# Patient Record
Sex: FEMALE | Race: WHITE
[De-identification: names, ages, dates, MRNs, and addresses within clinical notes are randomized per-mention and may not be internally consistent; named-entity substitution may affect disease eponyms.]

---

## 2017-03-02 ENCOUNTER — HOSPITAL ENCOUNTER (OUTPATIENT)
Dept: HOSPITAL 61 - KCIC US | Age: 32
Discharge: HOME | End: 2017-03-02
Attending: FAMILY MEDICINE
Payer: COMMERCIAL

## 2017-03-02 DIAGNOSIS — R10.9: Primary | ICD-10-CM

## 2017-03-02 PROCEDURE — 76700 US EXAM ABDOM COMPLETE: CPT

## 2017-03-02 NOTE — [facility]
PROCEDURE 

Abdomen ultrasound. 

 

HISTORY 

Abdominal pain, right upper quadrant pain 

 

COMPARISON 

None 

 

FINDINGS 

Multiple sonographic images of the abdomen are submitted. There is no 

abnormality of the visualized pancreas. Abdominal aortic caliber is within

normal limits up to 1.7 centimeters. There is segmental visualization of 

the inferior vena cava. No focal hepatic lesion is demonstrated. There is 

very mild coarsening of the echotexture of the liver. Right lobe liver 

measured 16 centimeters longitudinal. No free fluid is demonstrated. Right

kidney measured 10.7 x 3.6 x 4.9 centimeters, no hydronephrosis. Left 

kidney measured 10 by 5 x 4.3 centimeters, no hydronephrosis. Spleen 

measured 9.7 centimeters. Gallbladder is present without intraluminal 

abnormality, wall thickening, pericholecystic fluid. Common bile duct is 

within normal limits at 0.4 cm. 

 

IMPRESSION 

1. Other than very mild coarsening of the echotexture of liver which may 

be due to very mild steatosis, no significant abnormality is demonstrated.

 

 

 

Electronically signed by: Trev Lockett MD (Mar 02, 2017 09:15:48)

## 2017-04-13 ENCOUNTER — HOSPITAL ENCOUNTER (EMERGENCY)
Dept: HOSPITAL 61 - ER | Age: 32
Discharge: HOME | End: 2017-04-13
Payer: COMMERCIAL

## 2017-04-13 VITALS — SYSTOLIC BLOOD PRESSURE: 143 MMHG | DIASTOLIC BLOOD PRESSURE: 76 MMHG

## 2017-04-13 VITALS — BODY MASS INDEX: 23.92 KG/M2 | WEIGHT: 130 LBS | HEIGHT: 62 IN

## 2017-04-13 DIAGNOSIS — F17.200: ICD-10-CM

## 2017-04-13 DIAGNOSIS — F12.10: ICD-10-CM

## 2017-04-13 DIAGNOSIS — R11.2: ICD-10-CM

## 2017-04-13 DIAGNOSIS — D25.9: Primary | ICD-10-CM

## 2017-04-13 LAB
ALBUMIN SERPL-MCNC: 3.8 G/DL (ref 3.4–5)
ALBUMIN/GLOB SERPL: 1.3 {RATIO} (ref 1–1.7)
ALP SERPL-CCNC: 70 U/L (ref 46–116)
ALT SERPL-CCNC: 61 U/L (ref 14–59)
ANION GAP SERPL CALC-SCNC: 8 MMOL/L (ref 6–14)
AST SERPL-CCNC: 89 U/L (ref 15–37)
BACTERIA #/AREA URNS HPF: (no result) /HPF
BARBITURATES UR-MCNC: (no result) UG/ML
BASOPHILS # BLD AUTO: 0 X10^3/UL (ref 0–0.2)
BASOPHILS NFR BLD: 1 % (ref 0–3)
BENZODIAZ UR-MCNC: (no result) UG/L
BILIRUB SERPL-MCNC: 1 MG/DL (ref 0.2–1)
BILIRUB UR QL STRIP: NEGATIVE
BUN SERPL-MCNC: 11 MG/DL (ref 7–20)
BUN/CREAT SERPL: 12 (ref 6–20)
CALCIUM SERPL-MCNC: 8.9 MG/DL (ref 8.5–10.1)
CANNABINOIDS UR-MCNC: (no result) UG/L
CHLORIDE SERPL-SCNC: 107 MMOL/L (ref 98–107)
CO2 SERPL-SCNC: 28 MMOL/L (ref 21–32)
COCAINE UR-MCNC: (no result) NG/ML
CREAT SERPL-MCNC: 0.9 MG/DL (ref 0.6–1)
EOSINOPHIL NFR BLD: 9 % (ref 0–3)
ERYTHROCYTE [DISTWIDTH] IN BLOOD BY AUTOMATED COUNT: 13.3 % (ref 11.5–14.5)
ETHANOL, URINE: (no result)
GFR SERPLBLD BASED ON 1.73 SQ M-ARVRAT: 88.4 ML/MIN
GLOBULIN SER-MCNC: 3 G/DL (ref 2.2–3.8)
GLUCOSE SERPL-MCNC: 87 MG/DL (ref 70–99)
GLUCOSE UR STRIP-MCNC: NEGATIVE MG/DL
HCT VFR BLD CALC: 37.3 % (ref 36–47)
HGB BLD-MCNC: 12.6 G/DL (ref 12–15.5)
LYMPHOCYTES # BLD: 2 X10^3/UL (ref 1–4.8)
LYMPHOCYTES NFR BLD AUTO: 42 % (ref 24–48)
MCH RBC QN AUTO: 32 PG (ref 25–35)
MCHC RBC AUTO-ENTMCNC: 34 G/DL (ref 31–37)
MCV RBC AUTO: 95 FL (ref 79–100)
METHADONE SERPL-MCNC: (no result) NG/ML
MONOCYTES NFR BLD: 11 % (ref 0–9)
NEUTROPHILS NFR BLD AUTO: 37 % (ref 31–73)
NITRITE UR QL STRIP: NEGATIVE
OPIATES UR-MCNC: (no result) NG/ML
PCP SERPL-MCNC: (no result) MG/DL
PH UR STRIP: 6 [PH]
PLATELET # BLD AUTO: 236 X10^3/UL (ref 140–400)
POTASSIUM SERPL-SCNC: 3.7 MMOL/L (ref 3.5–5.1)
PROT SERPL-MCNC: 6.8 G/DL (ref 6.4–8.2)
PROT UR STRIP-MCNC: NEGATIVE MG/DL
RBC # BLD AUTO: 3.92 X10^6/UL (ref 3.5–5.4)
RBC #/AREA URNS HPF: 0 /HPF (ref 0–2)
SODIUM SERPL-SCNC: 143 MMOL/L (ref 136–145)
SP GR UR STRIP: >=1.03
SQUAMOUS #/AREA URNS LPF: (no result) /LPF
UROBILINOGEN UR-MCNC: 0.2 MG/DL
WBC # BLD AUTO: 4.8 X10^3/UL (ref 4–11)
WBC #/AREA URNS HPF: (no result) /HPF (ref 0–4)
YEAST #/AREA URNS HPF: PRESENT /HPF

## 2017-04-13 PROCEDURE — 83690 ASSAY OF LIPASE: CPT

## 2017-04-13 PROCEDURE — 87591 N.GONORRHOEAE DNA AMP PROB: CPT

## 2017-04-13 PROCEDURE — 85027 COMPLETE CBC AUTOMATED: CPT

## 2017-04-13 PROCEDURE — 99285 EMERGENCY DEPT VISIT HI MDM: CPT

## 2017-04-13 PROCEDURE — 74022 RADEX COMPL AQT ABD SERIES: CPT

## 2017-04-13 PROCEDURE — 76856 US EXAM PELVIC COMPLETE: CPT

## 2017-04-13 PROCEDURE — 36415 COLL VENOUS BLD VENIPUNCTURE: CPT

## 2017-04-13 PROCEDURE — 96374 THER/PROPH/DIAG INJ IV PUSH: CPT

## 2017-04-13 PROCEDURE — 81025 URINE PREGNANCY TEST: CPT

## 2017-04-13 PROCEDURE — 87491 CHLMYD TRACH DNA AMP PROBE: CPT

## 2017-04-13 PROCEDURE — 81001 URINALYSIS AUTO W/SCOPE: CPT

## 2017-04-13 PROCEDURE — 96375 TX/PRO/DX INJ NEW DRUG ADDON: CPT

## 2017-04-13 PROCEDURE — 80053 COMPREHEN METABOLIC PANEL: CPT

## 2017-04-13 PROCEDURE — 80305 DRUG TEST PRSMV DIR OPT OBS: CPT

## 2017-04-13 PROCEDURE — 80320 DRUG SCREEN QUANTALCOHOLS: CPT

## 2017-04-13 NOTE — ED.ADGEN
Past Medical History


Past Medical History:  Other


Additional Past Medical Histor:  UTERINE FIBROIDS,OVARIAN CYST,CHOLESTASIS OF 

LIVER,HERNIA


Past Surgical History:  , Other


Additional Past Surgical Histo:  D&C


Additional Information:  


0.5 PPD


Alcohol Use:  None


Drug Use:  Marijuana





Adult General


Chief Complaint


Chief Complaint:  ABDOMINAL PAIN





HPI


HPI


Patient is a 32  year old woman, history of ovarian cysts, fibroids, who 

presents to the emergency department with a complaint of lower abdominal 

cramping, nausea, vomiting and loose stool the past 3 days. Patient states she 

also developed discharge from the vagina today. She denies any possibility of 

STI exposures, she does have an IUD in place. Denies any injuries, denies any 

fevers or chills, denies any weakness numbness or tingling, any sick contacts 

or exposures, no recent travel or surgery. She has not taken any medications 

prior to going to the ED. No blood in her stool or emesis, states it is food 

and fluid. Patient states she was evaluated several weeks ago for her 

"gallbladder", and was diagnosed with cholestasis of the liver, she denies any 

upper abdominal symptoms at this time.





Review of Systems


Review of Systems


Constitutional:  Denies fever or chills. []


Eyes:  Denies change in visual acuity. []


HENT:  Denies nasal congestion or sore throat. [] 


Respiratory:  Denies cough or shortness of breath. [] 


Cardiovascular:  Denies chest pain or edema. [] 


GI: Crampy lower quadrant abdominal pain, nausea, vomiting, diarrhea, no bloody 

stools or bloody emesis.


:  Denies dysuria. [] 


Musculoskeletal:  Denies back pain or joint pain. [] 


Integument:  Denies rash. [] 


Neurologic:  Denies headache, focal weakness or sensory changes. [] 


Endocrine:  Denies polyuria or polydipsia. [] 


Lymphatic:  Denies swollen glands. [] 


Psychiatric:  Denies depression or anxiety. []





Current Medications


Current Medications





 Current Medications








 Medications


  (Trade)  Dose


 Ordered  Sig/Suzanne  Start Time


 Stop Time Status Last Admin


Dose Admin


 


 Ketorolac


 Tromethamine


  (Toradol)  10 mg  1X  ONCE  17 14:45


 17 14:46 DC 17 15:20


10 MG


 


 Ondansetron HCl


  (Zofran)  4 mg  1X  ONCE  17 14:45


 17 14:46 DC 17 15:23


4 MG











Allergies


Allergies





 Allergies








Coded Allergies Type Severity Reaction Last Updated Verified


 


  No Known Drug Allergies    17 No











Physical Exam


Physical Exam





Constitutional: Well developed, well nourished, no acute distress, non-toxic 

appearance. []


HENT: Normocephalic, atraumatic, bilateral external ears normal, oropharynx 

moist, no oral exudates, nose normal. []


Eyes: PERRLA, EOMI, conjunctiva normal, no discharge. [] 


Neck: Normal range of motion, no tenderness, supple, no stridor. [] 


Cardiovascular:Heart rate regular rhythm, no murmur, S1, S2, rubs or gallops.]


Lungs & Thorax:  Bilateral breath sounds clear to auscultation, no wheezing, 

rhonchi, rales. No chest tenderness or crepitus. []


Abdomen: Bowel sounds normal, soft, tenderness to palpation in the suprapubic 

pubic region, and the lower abdomen, no rebound, rigidity, no guarding, no 

masses, no pulsatile masses. [] 


Skin: Warm, dry, no erythema, no rash. [] 


Back: No tenderness, no CVA tenderness. [] 


Extremities: No tenderness, no cyanosis, no clubbing, ROM intact, no edema. [] 


Neurologic: Alert and oriented X 3, normal motor function, normal sensory 

function, no focal deficits noted. []


Psychologic: Affect normal, judgement normal, mood normal. []





Healthy examination: Patient with a normal-appearing external examination, no 

injuries or lesions identified. Bimanual examination reveals a closed office, 

with palpable strings from IUD, patient with tenderness to palpation on the 

right lateral cervical region, no adnexal masses or tenderness identified, no 

left-sided adnexal masses or tenderness. No CMT. Patient noted to have a 

moderate amount of white discharge on glove, specimens taken without issue, 

cervix is normal in appearance.





Current Patient Data


Vital Signs





 Vital Signs








  Date Time  Temp Pulse Resp B/P Pulse Ox O2 Delivery O2 Flow Rate FiO2


 


17 15:52  76 25 132/62 100 Room Air  


 


17 14:14 98.6       





 98.6       








Lab Values





 Laboratory Tests








Test


  17


13:29 17


14:05 17


14:48


 


POC Urine HCG, Qualitative


  Hcg negative


(Negative) 


  


 


 


Urine Collection Type  Unknown   


 


Urine Color  Yellow   


 


Urine Clarity  Cloudy   


 


Urine pH  6.0   


 


Urine Specific Gravity  >=1.030   


 


Urine Protein


  


  Negativemg/dL


(NEG-TRACE) 


 


 


Urine Glucose (UA)


  


  Negativemg/dL


(NEG) 


 


 


Urine Ketones (Stick)


  


  Negativemg/dL


(NEG) 


 


 


Urine Blood  Small (NEG)   


 


Urine Nitrite


  


  Negative (NEG)


  


 


 


Urine Bilirubin


  


  Negative (NEG)


  


 


 


Urine Urobilinogen Dipstick


  


  0.2mg/dL (0.2


mg/dL) 


 


 


Urine Leukocyte Esterase  Trace (NEG)   


 


Urine RBC  0/HPF (0-2)   


 


Urine WBC  1-4/HPF (0-4)   


 


Urine Squamous Epithelial


Cells 


  Many/LPF  


  


 


 


Urine Bacteria


  


  Mod/HPF


(0-FEW) 


 


 


Urine Mucus  Marked/LPF   


 


Urine Yeast  Present/HPF   


 


Urine Opiates Screen  Neg (NEG)   


 


Urine Methadone Screen  Neg (NEG)   


 


Urine Barbiturates  Neg (NEG)   


 


Urine Phencyclidine Screen  Neg (NEG)   


 


Urine


Amphetamine/Methamphetamine 


  Neg (NEG)  


  


 


 


Urine Benzodiazepines Screen  Neg (NEG)   


 


Urine Cocaine Screen  Neg (NEG)   


 


Urine Cannabinoids Screen  Pos (NEG)   


 


Urine Ethyl Alcohol  Neg (NEG)   


 


White Blood Count


  


  


  4.8x10^3/uL


(4.0-11.0)


 


Red Blood Count


  


  


  3.92x10^6/uL


(3.50-5.40)


 


Hemoglobin


  


  


  12.6g/dL


(12.0-15.5)


 


Hematocrit


  


  


  37.3%


(36.0-47.0)


 


Mean Corpuscular Volume   95fL ()  


 


Mean Corpuscular Hemoglobin   32pg (25-35)  


 


Mean Corpuscular Hemoglobin


Concent 


  


  34g/dL (31-37)


 


 


Red Cell Distribution Width


  


  


  13.3%


(11.5-14.5)


 


Platelet Count


  


  


  236x10^3/uL


(140-400)


 


Neutrophils (%) (Auto)   37% (31-73)  


 


Lymphocytes (%) (Auto)   42% (24-48)  


 


Monocytes (%) (Auto)   11% (0-9)  H


 


Eosinophils (%) (Auto)   9% (0-3)  H


 


Basophils (%) (Auto)   1% (0-3)  


 


Neutrophils # (Auto)


  


  


  1.8x10^3uL


(1.8-7.7)


 


Lymphocytes # (Auto)


  


  


  2.0x10^3/uL


(1.0-4.8)


 


Monocytes # (Auto)


  


  


  0.5x10^3/uL


(0.0-1.1)


 


Eosinophils # (Auto)


  


  


  0.4x10^3/uL


(0.0-0.7)


 


Basophils # (Auto)


  


  


  0.0x10^3/uL


(0.0-0.2)


 


Sodium Level


  


  


  143mmol/L


(136-145)


 


Potassium Level


  


  


  3.7mmol/L


(3.5-5.1)


 


Chloride Level


  


  


  107mmol/L


()


 


Carbon Dioxide Level


  


  


  28mmol/L


(21-32)


 


Anion Gap   8 (6-14)  


 


Blood Urea Nitrogen


  


  


  11mg/dL (7-20)


 


 


Creatinine


  


  


  0.9mg/dL


(0.6-1.0)


 


Estimated GFR


(Cockcroft-Gault) 


  


  88.4  


 


 


BUN/Creatinine Ratio   12 (6-20)  


 


Glucose Level


  


  


  87mg/dL


(70-99)


 


Calcium Level


  


  


  8.9mg/dL


(8.5-10.1)


 


Total Bilirubin


  


  


  1.0mg/dL


(0.2-1.0)


 


Aspartate Amino Transferase


(AST) 


  


  89U/L (15-37)


H


 


Alanine Aminotransferase (ALT)


  


  


  61U/L (14-59)


H


 


Alkaline Phosphatase


  


  


  70U/L ()


 


 


Total Protein


  


  


  6.8g/dL


(6.4-8.2)


 


Albumin


  


  


  3.8g/dL


(3.4-5.0)


 


Albumin/Globulin Ratio   1.3 (1.0-1.7)  


 


Lipase


  


  


  83U/L ()


 





 Laboratory Tests


17 14:48








 Laboratory Tests


17 14:48











Microbiology


17 Wet Prep - Final, Complete


          








EKG


EKG


Not indicated. []





Radiology/Procedures


Radiology/Procedures


[]


 [facility]


 [address]  [place], [place] [id number] [phone]


 


 IMAGING REPORT





 Signed





PATIENT: BENJAMIN EDMOND ACCOUNT: [id number] MRN#: [id number]


: 1985 LOCATION: ER AGE: 32


SEX: F EXAM DT: 17 ACCESSION#: [id number]


STATUS: REG ER ORD. PHYSICIAN: BERNICE RODRIGUEZ DO 


REASON: RL pelvic pain


PROCEDURE: PELVIS COMPLETE











PROCEDURE 


Pelvic ultrasound. 


 


HISTORY 


Right pelvic pain. 


 


TECHNIQUE 


Real-time ultrasound imaging of the pelvis using transabdominal and 


transvaginal window is performed. 


 


COMPARISON 


None. 


 


FINDINGS 


Right ovary measures 3.5 x 2.6 x 1.7 cm. Left ovary measures 3 x 3.1 x 2.2


cm. Normal blood flow in the ovaries. Small follicles are seen 


bilaterally. 


No evidence of adnexal mass. No cul-de-sac free fluid. 


The endometrial stripe is normal measuring 2 millimeters. IUD is seen in 


appropriate position. 


Uterus measures 6.9 x 5.3 x 3.8 cm. Anteverted uterus. There is a right 


hypoechoic fibroid near the fundus measuring 1.3 x 1.5 x 1.7 cm. 


 


IMPRESSION 


 


 


 


1. Small right fundal fibroid.


2. IUD in appropriate position.


3. Normal blood flow in the ovaries.


 


 


 


 


Electronically signed by: Jose Antonio Ahumada MD (2017 18:14:06)














DICTATED and SIGNED BY:     JOSE ANTONIO AHUMADA MD


DATE:     17 1813





CC: BERNICE RODRIGUEZ DO; NO PCP ~


Impressions:





 [facility]


 [address] [facility] [place] [address]


 [phone]


 


 IMAGING REPORT





 Signed





PATIENT: BENJAMIN EDMOND ACCOUNT: [id number] MRN#: [id number]


: 1985 LOCATION: ER AGE: 32


SEX: F EXAM DT: 17 ACCESSION#: [id number]


STATUS: REG ER ORD. PHYSICIAN: BERNICE RODRIGUEZ DO 


REASON: abd pain


PROCEDURE: ACUTE ABDOMEN SERIES











Acute abdomen series with chest, 3 views, 2017:





History: Lower abdominal pain





An IUD is projected over the mid pelvis. A linear radiopacity projected over


the midabdomen is probably an umbilical ornament. Clinical correlation is


suggested.





There is only small amount of gas in the GI tract in a nonspecific pattern. No


free air seen in the abdomen. There is no evidence of organomegaly. A lower


pelvic calcification on the right is probably a phlebolith.





The heart size is normal. The lungs are clear. There is no evidence of pleural


fluid.








IMPRESSION:


1. An IUD is in place.


2. No acute abdominal abnormality is detected.














DICTATED and SIGNED BY:     MORITZ,RICK S MD


DATE:     17 1535





CC: BERNICE RODRIGUEZ DO; NO PCP ~





Course & Med Decision Making


Course & Med Decision Making


Pertinent Labs and Imaging studies reviewed. (See chart for details)





Due to patient's complaints, laboratory studies, acute abdominal x-ray and 

ultrasound of the pelvis were obtained after discussion at bedside. Patient 

also received pain medication and antiemetics. Laboratory studies reveal no 

acutely concerning findings, ultrasound of the pelvis reveals a right fibroid, 

consistent with location of the patient's discomfort. On reevaluation patient 

is feeling much better, cramping has resolved, and she's had no further 

episodes of vomiting or diarrhea in the ED. Patient states that he does have 

follow-up with a primary care provider, [facility], 

but was receptive to additional resources, she was given a list of providers 

and contact information for Dr. Servin of OB/GYN. Instructed the patient to 

stay well-hydrated, get plenty of rest, follow dietary recommendations, she may 

be experiencing a viral illness causing her other symptoms. Concerning symptoms 

to prompt return to the ED for additional evaluation were discussed. Patient 

received perfusion for Bentyl and Zofran in the ED, voiced understanding and 

agreement with plan and precautions as stated, and was discharged home in 

stable condition with plan as above.





Dragon Disclaimer


Dragon Disclaimer


This electronic medical record was generated, in whole or in part, using a 

voice recognition dictation system.





Departure


Impression:  


 Primary Impression:  


 Fibroid


 Additional Impressions:  


 Nausea and vomiting


 Abdominal pain


Disposition:  01 HOME, SELF-CARE


Condition:  IMPROVED


Scripts


Dicyclomine Hcl (Bentyl)10 Mg Cljiwts00 Mg PO QID PRN PAIN #12 TAB


   Prov:BERNICE RODRIGUEZ DO         17


Ondansetron Hcl (Zofran)4 Mg Tablet4 Mg PO BID PRN NAUSEA/VOMITING #12 TAB


   Prov:BERNICE RODRIGUEZ DO         17





Problem Qualifiers








BERNICE RODRIGUEZ DO 2017 18:36

## 2017-04-13 NOTE — RAD
PROCEDURE 

Pelvic ultrasound. 

 

HISTORY 

Right pelvic pain. 

 

TECHNIQUE 

Real-time ultrasound imaging of the pelvis using transabdominal and 

transvaginal window is performed. 

 

COMPARISON 

None. 

 

FINDINGS 

Right ovary measures 3.5 x 2.6 x 1.7 cm. Left ovary measures 3 x 3.1 x 2.2

cm. Normal blood flow in the ovaries. Small follicles are seen 

bilaterally. 

No evidence of adnexal mass. No cul-de-sac free fluid. 

The endometrial stripe is normal measuring 2 millimeters. IUD is seen in 

appropriate position. 

Uterus measures 6.9 x 5.3 x 3.8 cm. Anteverted uterus. There is a right 

hypoechoic fibroid near the fundus measuring 1.3 x 1.5 x 1.7 cm. 

 

IMPRESSION 

 

 

 

1. Small right fundal fibroid.

2. IUD in appropriate position.

3. Normal blood flow in the ovaries.

 

 

 

 

Electronically signed by: Jose Antonio Ahumada MD (Apr 13, 2017 18:14:06)

## 2017-04-13 NOTE — RAD
Acute abdomen series with chest, 3 views, 4/13/2017:



History: Lower abdominal pain



An IUD is projected over the mid pelvis. A linear radiopacity projected over

the midabdomen is probably an umbilical ornament. Clinical correlation is

suggested.



There is only small amount of gas in the GI tract in a nonspecific pattern. No

free air seen in the abdomen. There is no evidence of organomegaly. A lower

pelvic calcification on the right is probably a phlebolith.



The heart size is normal. The lungs are clear. There is no evidence of pleural

fluid.





IMPRESSION:

1. An IUD is in place.

2. No acute abdominal abnormality is detected.

## 2019-12-17 ENCOUNTER — HOSPITAL ENCOUNTER (EMERGENCY)
Dept: HOSPITAL 61 - ER | Age: 34
Discharge: HOME | End: 2019-12-17
Payer: SELF-PAY

## 2019-12-17 VITALS — SYSTOLIC BLOOD PRESSURE: 134 MMHG | DIASTOLIC BLOOD PRESSURE: 69 MMHG

## 2019-12-17 VITALS — WEIGHT: 132 LBS | HEIGHT: 62 IN | BODY MASS INDEX: 24.29 KG/M2

## 2019-12-17 DIAGNOSIS — R10.13: Primary | ICD-10-CM

## 2019-12-17 NOTE — PHYS DOC
Past Medical History


Past Medical History:  Other


Additional Past Medical Histor:  UTERINE FIBROIDS,OVARIAN CYST,CHOLESTASIS OF 

LIVER,HERNIA, heart murmur


Past Surgical History:  , Other


Additional Past Surgical Histo:  D&C


Alcohol Use:  Rarely


Drug Use:  None





Adult General


Chief Complaint


Chief Complaint:  FLU SYMPTOM





HPI


HPI





Patient is a 34  year old AA female who presents to the emergency department 

with complaints of epigastric pain and flu symptoms. Patient states that 

symptoms began 3 days ago. She has noticed an increase in her epigastric pain 

after taking multiple OTC medications for her flu like sx including dayquil, 

nyquil,  benadryl, sudafed, tylenol, and ibuprofen. She states the epigastric 

pain makes her feel nauseated, but denies any vomiting or diarrhea. Pt states 

she last had a fever 2 days ago. She has had a dry cough for the last 3 days. 

Currently, she rates the pain an 8/10 on the pain scale she denies any 

alleviating factors. Pt states she has been told her abdominal pain was due to 

GERD in the past. All other ROS is neg unless otherwise noted in HPI.





Review of Systems


Review of Systems


See Above





Allergies


Allergies





Allergies








Coded Allergies Type Severity Reaction Last Updated Verified


 


  No Known Drug Allergies    17 No











Physical Exam


Physical Exam


See Above


Constitutional: Well developed, well nourished, no acute distress, non-toxic 

appearance. []


HENT: Normocephalic, atraumatic, bilateral external ears normal, nose normal. []


Eyes: PERRLA, EOMI, conjunctiva normal, no discharge. [] 


Neck: Normal range of motion, no stridor. [] 


Cardiovascular:Heart rate regular rhythm, no murmur []


Lungs & Thorax:  Bilateral breath sounds clear to auscultation, Respirations 

even and unlabored, no retractions, no respiratory distress []


Abdomen: Bowel sounds normal, soft, epigastric TTP, no rebound tenderness, no 

guarding, no masses, no pulsatile masses. [] 


Skin: Warm, dry, no erythema, no rash. [] 


Extremities: No cyanosis, ROM intact


Neurologic: Alert and oriented X 3,  no focal deficits noted. []


Psychologic: Affect normal, judgement normal, mood normal. []





Current Patient Data


Vital Signs





                                   Vital Signs








  Date Time  Temp Pulse Resp B/P (MAP) Pulse Ox O2 Delivery O2 Flow Rate FiO2


 


19 09:34 98.3 70 16 134/69 (90) 98 Room Air  





 98.3       











EKG


EKG


[]





Radiology/Procedures


Radiology/Procedures


[]





Course & Med Decision Making


Course & Med Decision Making


Pertinent Labs and Imaging studies reviewed. (See chart for details)


dx: medical screening exam 


A medical screening exam was performed, patient was found to have no emergent 

medical condition. The plan of care would've included a GI cocktail, diet 

instructions, and GERD instructions . However, the patient eloped after talking 

with registration.


[]


[]





Dragon Disclaimer


Dragon Disclaimer


This electronic medical record was generated, in whole or in part, using a voice

 recognition dictation system.





Departure


Departure


Impression:  


   Primary Impression:  


   Encounter for medical screening examination


Disposition:  01 HOME, SELF-CARE (pt eloped after speaking with registration)


Condition:  STABLE


Referrals:  


NO PCP (PCP)











FAITH THOMAS       Dec 17, 2019 10:03

## 2020-05-30 ENCOUNTER — HOSPITAL ENCOUNTER (EMERGENCY)
Dept: HOSPITAL 61 - ER | Age: 35
Discharge: HOME | End: 2020-05-30
Payer: COMMERCIAL

## 2020-05-30 VITALS — WEIGHT: 137.13 LBS | BODY MASS INDEX: 25.23 KG/M2 | HEIGHT: 62 IN

## 2020-05-30 VITALS
DIASTOLIC BLOOD PRESSURE: 70 MMHG | SYSTOLIC BLOOD PRESSURE: 142 MMHG | SYSTOLIC BLOOD PRESSURE: 142 MMHG | DIASTOLIC BLOOD PRESSURE: 70 MMHG | SYSTOLIC BLOOD PRESSURE: 142 MMHG | DIASTOLIC BLOOD PRESSURE: 70 MMHG

## 2020-05-30 DIAGNOSIS — N83.202: ICD-10-CM

## 2020-05-30 DIAGNOSIS — D25.9: ICD-10-CM

## 2020-05-30 DIAGNOSIS — K57.92: Primary | ICD-10-CM

## 2020-05-30 DIAGNOSIS — R11.2: ICD-10-CM

## 2020-05-30 DIAGNOSIS — F17.200: ICD-10-CM

## 2020-05-30 LAB
% BANDS: 2 % (ref 0–9)
% LYMPHS: 9 % (ref 24–48)
% MONOS: 5 % (ref 0–10)
% SEGS: 84 % (ref 35–66)
ALBUMIN SERPL-MCNC: 3.9 G/DL (ref 3.4–5)
ALBUMIN/GLOB SERPL: 1.2 {RATIO} (ref 1–1.7)
ALP SERPL-CCNC: 77 U/L (ref 46–116)
ALT SERPL-CCNC: 31 U/L (ref 14–59)
ANION GAP SERPL CALC-SCNC: 11 MMOL/L (ref 6–14)
APTT PPP: (no result) S
AST SERPL-CCNC: 20 U/L (ref 15–37)
BACTERIA #/AREA URNS HPF: 0 /HPF
BASOPHILS # BLD AUTO: 0 X10^3/UL (ref 0–0.2)
BASOPHILS NFR BLD: 0 % (ref 0–3)
BILIRUB SERPL-MCNC: 1.5 MG/DL (ref 0.2–1)
BILIRUB UR QL STRIP: NEGATIVE
BUN SERPL-MCNC: 9 MG/DL (ref 7–20)
BUN/CREAT SERPL: 8 (ref 6–20)
CALCIUM SERPL-MCNC: 8.8 MG/DL (ref 8.5–10.1)
CHLORIDE SERPL-SCNC: 101 MMOL/L (ref 98–107)
CO2 SERPL-SCNC: 25 MMOL/L (ref 21–32)
CREAT SERPL-MCNC: 1.1 MG/DL (ref 0.6–1)
EOSINOPHIL NFR BLD: 0 % (ref 0–3)
EOSINOPHIL NFR BLD: 0 X10^3/UL (ref 0–0.7)
ERYTHROCYTE [DISTWIDTH] IN BLOOD BY AUTOMATED COUNT: 13.1 % (ref 11.5–14.5)
FIBRINOGEN PPP-MCNC: CLEAR MG/DL
GFR SERPLBLD BASED ON 1.73 SQ M-ARVRAT: 68.8 ML/MIN
GLOBULIN SER-MCNC: 3.3 G/DL (ref 2.2–3.8)
GLUCOSE SERPL-MCNC: 106 MG/DL (ref 70–99)
HCT VFR BLD CALC: 41.1 % (ref 36–47)
HGB BLD-MCNC: 13.9 G/DL (ref 12–15.5)
LIPASE: 46 U/L (ref 73–393)
LYMPHOCYTES # BLD: 0.6 X10^3/UL (ref 1–4.8)
LYMPHOCYTES NFR BLD AUTO: 7 % (ref 24–48)
MCH RBC QN AUTO: 33 PG (ref 25–35)
MCHC RBC AUTO-ENTMCNC: 34 G/DL (ref 31–37)
MCV RBC AUTO: 96 FL (ref 79–100)
MONO #: 0.3 X10^3/UL (ref 0–1.1)
MONOCYTES NFR BLD: 4 % (ref 0–9)
NEUT #: 7.5 X10^3/UL (ref 1.8–7.7)
NEUTROPHILS NFR BLD AUTO: 88 % (ref 31–73)
NITRITE UR QL STRIP: NEGATIVE
PH UR STRIP: 7 [PH]
PLATELET # BLD AUTO: 210 X10^3/UL (ref 140–400)
PLATELET # BLD EST: ADEQUATE 10*3/UL
POTASSIUM SERPL-SCNC: 3.6 MMOL/L (ref 3.5–5.1)
PROT SERPL-MCNC: 7.2 G/DL (ref 6.4–8.2)
PROT UR STRIP-MCNC: 30 MG/DL
RBC # BLD AUTO: 4.27 X10^6/UL (ref 3.5–5.4)
RBC #/AREA URNS HPF: 0 /HPF (ref 0–2)
SODIUM SERPL-SCNC: 137 MMOL/L (ref 136–145)
SQUAMOUS #/AREA URNS LPF: (no result) /LPF
UROBILINOGEN UR-MCNC: 1 MG/DL
WBC # BLD AUTO: 8.5 X10^3/UL (ref 4–11)
WBC #/AREA URNS HPF: (no result) /HPF (ref 0–4)

## 2020-05-30 PROCEDURE — 96375 TX/PRO/DX INJ NEW DRUG ADDON: CPT

## 2020-05-30 PROCEDURE — 81001 URINALYSIS AUTO W/SCOPE: CPT

## 2020-05-30 PROCEDURE — 96374 THER/PROPH/DIAG INJ IV PUSH: CPT

## 2020-05-30 PROCEDURE — 80053 COMPREHEN METABOLIC PANEL: CPT

## 2020-05-30 PROCEDURE — 85025 COMPLETE CBC W/AUTO DIFF WBC: CPT

## 2020-05-30 PROCEDURE — 81025 URINE PREGNANCY TEST: CPT

## 2020-05-30 PROCEDURE — 83690 ASSAY OF LIPASE: CPT

## 2020-05-30 PROCEDURE — 74177 CT ABD & PELVIS W/CONTRAST: CPT

## 2020-05-30 PROCEDURE — 96376 TX/PRO/DX INJ SAME DRUG ADON: CPT

## 2020-05-30 PROCEDURE — 96361 HYDRATE IV INFUSION ADD-ON: CPT

## 2020-05-30 PROCEDURE — 99285 EMERGENCY DEPT VISIT HI MDM: CPT

## 2020-05-30 PROCEDURE — 85007 BL SMEAR W/DIFF WBC COUNT: CPT

## 2020-05-30 PROCEDURE — 36415 COLL VENOUS BLD VENIPUNCTURE: CPT

## 2020-05-30 NOTE — PHYS DOC
Past Medical History


Past Medical History:  Other


Additional Past Medical Histor:  UTERINE FIBROIDS,OVARIAN CYST,CHOLESTASIS OF 

LIVER,HERNIA, heart murmur


Past Surgical History:  , Other


Additional Past Surgical Histo:  D&C


Smoking Status:  Current Every Day Smoker


Alcohol Use:  Rarely


Drug Use:  None





General Adult


EDM:


Chief Complaint:  ABDOMINAL PAIN





HPI:


HPI:





Patient is a 35  year old [f__sex] who presents with []





Review of Systems:


Review of Systems:


Constitutional:  Denies fever or chills. []


Eyes:  Denies change in visual acuity. []


HENT:  Denies nasal congestion or sore throat. [] 


Respiratory:  Denies cough or shortness of breath. [] 


Cardiovascular:  Denies chest pain or edema. [] 


GI:  Denies abdominal pain, nausea, vomiting, bloody stools or diarrhea. [] 


:  Denies dysuria. [] 


Musculoskeletal:  Denies back pain or joint pain. [] 


Integument:  Denies rash. [] 


Neurologic:  Denies headache, focal weakness or sensory changes. [] 


Endocrine:  Denies polyuria or polydipsia. [] 


Lymphatic:  Denies swollen glands. [] 


Psychiatric:  Denies depression or anxiety. []





Heart Score:


Risk Factors:


Risk Factors:  DM, Current or recent (<one month) smoker, HTN, HLP, family 

history of CAD, obesity.


Risk Scores:


Score 0 - 3:  2.5% MACE over next 6 weeks - Discharge Home


Score 4 - 6:  20.3% MACE over next 6 weeks - Admit for Clinical Observation


Score 7 - 10:  72.7% MACE over next 6 weeks - Early Invasive Strategies





Current Medications:





Current Medications








 Medications


  (Trade)  Dose


 Ordered  Sig/Suzanne  Start Time


 Stop Time Status Last Admin


Dose Admin


 


 Info


  (CONTRAST GIVEN


 -- Rx MONITORING)  1 each  PRN DAILY  PRN  20 10:00


 20 09:59   





 


 Iohexol


  (Omnipaque 300


 Mg/ml)  75 ml  1X  ONCE  20 10:00


 20 10:02 DC 20 10:07


75 ML


 


 Iohexol


  (Omnipaque 350


 Mg/ml)  75 ml  1X  ONCE  20 10:00


 20 10:01 DC  





 


 Morphine Sulfate


  (Morphine


 Sulfate)  4 mg  1X  ONCE  20 09:30


 20 09:31 DC 20 09:39


4 MG


 


 Ondansetron HCl


  (Zofran)  4 mg  1X  ONCE  20 09:30


 20 09:31 DC 20 09:38


4 MG


 


 Sodium Chloride  1,000 ml @ 


 1,000 mls/hr  1X  ONCE  20 09:30


 20 10:29 DC 20 09:39


1,000 MLS/HR











Allergies:


Allergies:





Allergies








Coded Allergies Type Severity Reaction Last Updated Verified


 


  No Known Drug Allergies    17 No











Physical Exam:


PE:





Constitutional: Well developed, well nourished, no acute distress, non-toxic 

appearance. []


HENT: Normocephalic, atraumatic, bilateral external ears normal, oropharynx 

moist, no oral exudates, nose normal. []


Eyes: PERRLA, EOMI, conjunctiva normal, no discharge. [] 


Neck: Normal range of motion, no tenderness, supple, no stridor. [] 


Cardiovascular:Heart rate regular rhythm, no murmur []


Lungs & Thorax:  Bilateral breath sounds clear to auscultation []


Abdomen: Bowel sounds normal, soft, no tenderness, no masses, no pulsatile 

masses. [] 


Skin: Warm, dry, no erythema, no rash. [] 


Back: No tenderness, no CVA tenderness. [] 


Extremities: No tenderness, no cyanosis, no clubbing, ROM intact, no edema. [] 


Neurologic: Alert and oriented X 3, normal motor function, normal sensory 

function, no focal deficits noted. []


Psychologic: Affect normal, judgement normal, mood normal. []





Current Patient Data:


Labs:





                                Laboratory Tests








Test


 20


08:37 20


08:41 20


09:20


 


Urine Collection Type Unknown    


 


Urine Color Yuni    


 


Urine Clarity Clear    


 


Urine pH


 7.0 (<5.0-8.0)


 


 





 


Urine Specific Gravity


 1.025


(1.000-1.030) 


 





 


Urine Protein


 30 mg/dL


(NEG-TRACE) 


 





 


Urine Glucose (UA)


 Negative mg/dL


(NEG) 


 





 


Urine Ketones (Stick)


 40 mg/dL (NEG)


 


 





 


Urine Blood


 Negative (NEG)


 


 





 


Urine Nitrite


 Negative (NEG)


 


 





 


Urine Bilirubin


 Negative (NEG)


 


 





 


Urine Urobilinogen Dipstick


 1.0 mg/dL (0.2


mg/dL) 


 





 


Urine Leukocyte Esterase


 Negative (NEG)


 


 





 


Urine RBC 0 /HPF (0-2)    


 


Urine WBC


 Occ /HPF (0-4)


 


 





 


Urine Squamous Epithelial


Cells Few /LPF  


 


 





 


Urine Bacteria


 0 /HPF (0-FEW)


 


 





 


Urine Mucus Mod /LPF    


 


POC Urine HCG, Qualitative


 


 Hcg negative


(Negative) 





 


White Blood Count


 


 


 8.5 x10^3/uL


(4.0-11.0)


 


Red Blood Count


 


 


 4.27 x10^6/uL


(3.50-5.40)


 


Hemoglobin


 


 


 13.9 g/dL


(12.0-15.5)


 


Hematocrit


 


 


 41.1 %


(36.0-47.0)


 


Mean Corpuscular Volume


 


 


 96 fL ()





 


Mean Corpuscular Hemoglobin   33 pg (25-35)  


 


Mean Corpuscular Hemoglobin


Concent 


 


 34 g/dL


(31-37)


 


Red Cell Distribution Width


 


 


 13.1 %


(11.5-14.5)


 


Platelet Count


 


 


 210 x10^3/uL


(140-400)


 


Neutrophils (%) (Auto)   88 % (31-73)  H


 


Lymphocytes (%) (Auto)   7 % (24-48)  L


 


Monocytes (%) (Auto)   4 % (0-9)  


 


Eosinophils (%) (Auto)   0 % (0-3)  


 


Basophils (%) (Auto)   0 % (0-3)  


 


Neutrophils # (Auto)


 


 


 7.5 x10^3/uL


(1.8-7.7)


 


Lymphocytes # (Auto)


 


 


 0.6 x10^3/uL


(1.0-4.8)  L


 


Monocytes # (Auto)


 


 


 0.3 x10^3/uL


(0.0-1.1)


 


Eosinophils # (Auto)


 


 


 0.0 x10^3/uL


(0.0-0.7)


 


Basophils # (Auto)


 


 


 0.0 x10^3/uL


(0.0-0.2)


 


Platelet Estimate   Pending  


 


Sodium Level


 


 


 137 mmol/L


(136-145)


 


Potassium Level


 


 


 3.6 mmol/L


(3.5-5.1)


 


Chloride Level


 


 


 101 mmol/L


()


 


Carbon Dioxide Level


 


 


 25 mmol/L


(21-32)


 


Anion Gap   11 (6-14)  


 


Blood Urea Nitrogen


 


 


 9 mg/dL (7-20)





 


Creatinine


 


 


 1.1 mg/dL


(0.6-1.0)  H


 


Estimated GFR


(Cockcroft-Gault) 


 


 68.8  





 


BUN/Creatinine Ratio   8 (6-20)  


 


Glucose Level


 


 


 106 mg/dL


(70-99)  H


 


Calcium Level


 


 


 8.8 mg/dL


(8.5-10.1)


 


Total Bilirubin


 


 


 1.5 mg/dL


(0.2-1.0)  H


 


Aspartate Amino Transferase


(AST) 


 


 20 U/L (15-37)





 


Alanine Aminotransferase (ALT)


 


 


 31 U/L (14-59)





 


Alkaline Phosphatase


 


 


 77 U/L


()


 


Total Protein


 


 


 7.2 g/dL


(6.4-8.2)


 


Albumin


 


 


 3.9 g/dL


(3.4-5.0)


 


Albumin/Globulin Ratio   1.2 (1.0-1.7)  


 


Lipase


 


 


 46 U/L


()  L





                                Laboratory Tests


20 09:20








                                Laboratory Tests


20 09:20








Vital Signs:





                                   Vital Signs








  Date Time  Temp Pulse Resp B/P (MAP) Pulse Ox O2 Delivery O2 Flow Rate FiO2


 


20 09:08 99.1 80 18 154/67 (96) 100 Room Air  





 99.1       











EKG:


EKG:


[]





Radiology/Procedures:


Radiology/Procedures:


[]


Impression:








IMPRESSION: Mild diverticulitis of the distal descending colon.


 


Nonobstructing 2.5 mm stone of the upper pole of the left kidney.


 


3 uterine fibroids. 16 mm corpus seems cyst of the left ovary. No free 


fluid.





Course & Med Decision Making:


Course & Med Decision Making


Pertinent Labs and Imaging studies reviewed. (See chart for details)





Ordered labs, UA, urine pregnancy, CT abdomen pelvis with IV contrast, IV 

morphine, IV Zofran, IV fluids





Patient does have mild leukocytosis.





CT shows that patient has mild diverticulitis.  There also fibroid in the 

uterus.  There is also left ovarian cyst.





Patient is given another dose of IV morphine.





I did offer admission to patient the patient wants to go home and started to be 

treated as an outpatient.


After patient that she needs to return to the ED if she starts developing fever,

 intractable vomiting or intractable abdominal pain.








Patient is given p.o. challenge in the ER and she does not vomit.





Patient will be discharged home Cipro, Flagyl, Norco and Zofran.





Discussed  results and plan of care with patient.


Patient is instructed to follow up with PCP in one to 2 days.


Appropriate discharge instructions given to patient to return to the ED or to 

seek immediate medical evaluation.


Patient is instructed to return to the ED if symptoms worsen or if any concerns.





Dragon Disclaimer:


Dragon Disclaimer:


This electronic medical record was generated, in whole or in part, using a voice

 recognition dictation system.





Departure


Departure


Impression:  


   Primary Impression:  


   Diverticulitis


   Additional Impressions:  


   Uterine fibroid


   Ovarian cyst


Disposition:   HOME, SELF-CARE


Condition:  IMPROVED


Referrals:  


NO PCP (PCP)


Patient Instructions:  Diverticulitis, Fibroids, Easy-to-Read, Ovarian Cyst


Scripts


Ondansetron Hcl (ZOFRAN) 4 Mg Tablet


4 MG PO PRN TID PRN for NAUSEA, #15


   nausea/vomiting


   Prov: MATHEW CABELLO DO         20 


Hydrocodone/Apap 5-325 (NORCO 5-325 TABLET) 1 Each Tablet


1 EACH PO PRN Q6HRS PRN for PAIN MDD 6 TABS, #15


   as needed for pain


   Prov: MATHEW CABELLO E DO         20 


Metronidazole (FLAGYL) 500 Mg Tablet


500 MG PO TID for 10 Days, #30 TAB


   Prov: GOLDAMATHEW E DO         20 


Ciprofloxacin Hcl (CIPRO) 500 Mg Tablet


1 TAB PO BID for 10 Days, #20 TAB 0 Refills


   Prov: SHIRA CABELLOLI E DO         20











DESTINEYMATHEW E DO           May 30, 2020 11:02

## 2020-05-30 NOTE — RAD
CT study of the abdomen and pelvis with contrast

 

Clinical indications: Left lower quadrant abdominal pain.

 

TECHNIQUE: After IV infusion of 75 cc of Omnipaque 300, helical CT 

scanning of the abdomen and pelvis was performed. No GI contrast was 

administered. This may decrease the sensitivity to detect GI tract 

pathology.

 

 

 

PQRS compliance Statement

 

One or more of the following individualized dose reduction techniques were

utilized for this study:

1.  Automated exposure control

2.  Adjustment of the mA and/or kV according to patient size

3.  Use of iterative reconstruction technique

 

COMPARISON: No previous CT available.

 

FINDINGS: The liver and spleen and pancreas and gallbladder are normal. No

extra hepatic biliary ductal dilatation is seen. No adrenal mass is 

evident. Small punctate stone is seen within the upper pole of the left 

kidney measuring 2.5 mm in size. No hydronephrosis or hydroureter or 

ureteral stone is seen. Urinary bladder is not abnormally distended. IUD 

is seen within the central aspect of the uterus which is proper 

positioning. At least 3 uterine fibroids are seen with the largest 

measuring 3 cm. There is an enhancing corpus luteum cyst of the left ovary

measuring 16 mm. No free fluid is evident. The right ovary is 

unremarkable. No focal aneurysmal dilatation of the abdominal aorta is 

seen. No enlarged abdominal or pelvic lymphadenopathy is evident. There is

focal wall thickening of the distal descending colon with mild pericolonic

inflammatory change. There is adjacent diverticuli. The findings are 

consistent with mild diverticulitis. No abscess or free air or bowel 

obstruction is seen. The terminal ileum is unremarkable. There is no CT 

findings of appendicitis. No lung base consolidative infiltrate is seen. 

No lytic process is seen.

 

IMPRESSION: Mild diverticulitis of the distal descending colon.

 

Nonobstructing 2.5 mm stone of the upper pole of the left kidney.

 

3 uterine fibroids. 16 mm corpus seems cyst of the left ovary. No free 

fluid.

 

Electronically signed by: Jere Willis MD (5/30/2020 10:42 AM) [id number]

## 2020-07-07 ENCOUNTER — HOSPITAL ENCOUNTER (OUTPATIENT)
Dept: HOSPITAL 61 - MAMMO | Age: 35
Discharge: HOME | End: 2020-07-07
Attending: FAMILY MEDICINE
Payer: COMMERCIAL

## 2020-07-07 DIAGNOSIS — R92.2: Primary | ICD-10-CM

## 2020-07-07 DIAGNOSIS — N63.20: ICD-10-CM

## 2020-07-07 PROCEDURE — 77066 DX MAMMO INCL CAD BI: CPT

## 2020-07-07 PROCEDURE — 76641 ULTRASOUND BREAST COMPLETE: CPT

## 2020-07-07 NOTE — RAD
DATE: 7/7/2020 11:32 AM



EXAM: MAMMO ALBERTO NICOLE ROSALES, BREAST ultrasound LEFT



HISTORY:  31-year-old woman with a left breast lump.



COMPARISON: None. This will serve as a baseline.



TECHNIQUE:



Bilateral CC and MLO views of the breasts were performed. Bilateral breast

tomosynthesis was performed in CC and MLO projections.



This study was interpreted with the benefit of Computerized Aided Detection

(CAD). A triangular marker on the lateral aspect of the left breast identifies

the patient's reported area of palpable concern. Targeted ultrasound was

pursued of this area as well.





FINDINGS:



Breast Density: DENSE  The breast Parenchyma is dense, which could reduce the

sensitivity of mammography. Breast parenchyma level density D.



There is no mammographic correlate to the area of reported palpable concern on

self exam as identified by the patient.

No suspicious masses, microcalcifications or architectural distortion is

present to suggest malignancy in either breast.

The visualized axillae are unremarkable.



Targeted ultrasound of the left breast in the area of reported palpable

concern revealed no sonographic abnormality. This should be managed

clinically.



IMPRESSION: No evidence of malignancy. 



BI-RADS CATEGORY: 1 NEGATIVE



RECOMMENDED FOLLOW-UP: CLIN FOLLOW UP IMAGING AS CLINICALLY INDICATED

age-appropriate annual screening mammography is recommended, unless clinically

indicated sooner based on symptoms or change in physical exam. If there are

any clinically suspicious findings, they should be considered for biopsy even

in the absence of any suspicious imaging correlate. Discussed with patient



PQRS compliance statement: Patient information was entered into a reminder

system with a target due date for the next mammogram.



Mammography is a sensitive method for finding small breast cancers, but it

does not detect them all and is not a substitute for careful clinical

examination.  A negative mammogram does not negate a clinically suspicious

finding and should not result in delay in biopsying a clinically suspicious

abnormality.



"Our facility is accredited by the American [employer] Mammography

Program."

## 2020-08-18 ENCOUNTER — HOSPITAL ENCOUNTER (OUTPATIENT)
Dept: HOSPITAL 61 - KCIC | Age: 35
Discharge: HOME | End: 2020-08-18
Attending: FAMILY MEDICINE
Payer: COMMERCIAL

## 2020-08-18 DIAGNOSIS — M79.671: ICD-10-CM

## 2020-08-18 DIAGNOSIS — M77.31: Primary | ICD-10-CM

## 2020-08-18 PROCEDURE — 73630 X-RAY EXAM OF FOOT: CPT

## 2020-08-18 NOTE — KCIC
EXAM: Right foot 3 views.

 

HISTORY: Right foot pain after injury.

 

COMPARISON: None.

 

FINDINGS: Three views of the right foot are obtained.

 

No fractures are identified. Alignment is normal. Joint spaces are 

maintained. There is a moderate plantar calcaneal spur.

 

IMPRESSION:

1. No fracture.

 

Electronically signed by: SULMA Low MD (8/18/2020 12:41 PM) 

[id number]

## 2022-02-11 ENCOUNTER — HOSPITAL ENCOUNTER (OUTPATIENT)
Dept: HOSPITAL 61 - SURG | Age: 37
Discharge: HOME | End: 2022-02-11
Attending: INTERNAL MEDICINE
Payer: COMMERCIAL

## 2022-02-11 VITALS — SYSTOLIC BLOOD PRESSURE: 123 MMHG | DIASTOLIC BLOOD PRESSURE: 59 MMHG

## 2022-02-11 VITALS — SYSTOLIC BLOOD PRESSURE: 115 MMHG | DIASTOLIC BLOOD PRESSURE: 73 MMHG

## 2022-02-11 VITALS — HEIGHT: 62 IN | WEIGHT: 132.28 LBS | BODY MASS INDEX: 24.34 KG/M2

## 2022-02-11 DIAGNOSIS — Z72.89: ICD-10-CM

## 2022-02-11 DIAGNOSIS — R19.7: Primary | ICD-10-CM

## 2022-02-11 DIAGNOSIS — Z98.890: ICD-10-CM

## 2022-02-11 DIAGNOSIS — K63.89: ICD-10-CM

## 2022-02-11 DIAGNOSIS — K21.00: ICD-10-CM

## 2022-02-11 DIAGNOSIS — R12: ICD-10-CM

## 2022-02-11 DIAGNOSIS — K31.89: ICD-10-CM

## 2022-02-11 DIAGNOSIS — J45.909: ICD-10-CM

## 2022-02-11 DIAGNOSIS — D12.5: ICD-10-CM

## 2022-02-11 DIAGNOSIS — F17.210: ICD-10-CM

## 2022-02-11 DIAGNOSIS — Z79.899: ICD-10-CM

## 2022-02-11 PROCEDURE — 88305 TISSUE EXAM BY PATHOLOGIST: CPT

## 2022-02-11 PROCEDURE — 81025 URINE PREGNANCY TEST: CPT

## 2022-02-11 PROCEDURE — 43239 EGD BIOPSY SINGLE/MULTIPLE: CPT

## 2022-02-11 PROCEDURE — 45384 COLONOSCOPY W/LESION REMOVAL: CPT

## 2022-02-11 PROCEDURE — 45380 COLONOSCOPY AND BIOPSY: CPT

## 2022-02-11 PROCEDURE — 45385 COLONOSCOPY W/LESION REMOVAL: CPT

## 2022-02-14 NOTE — PATHOLOGY
[facility] Accession Number: [id number]

.                                                                01

Material submitted:                                        .

PART A: duodenum - DUODENAL BIOPSY

PART B: esophagus - ESOPHAGEAL BIOPSY

PART C: colon - RANDOM COLON BIOPSY

PART D: colon - SIGMOID COLON POLYP. Modifiers: sigmoid

.                                                                01

Clinical history:                                          .

GERD

EGD/COLONOSCOPY

.                                                                02

**********************************************************************

Diagnosis:

A. Duodenal biopsies:

- No significant pathologic abnormalities.

.

B. Esophageal biopsies:

- Reflux changes.

.

C. Colonic mucosa, random colon biopsies:

- No significant pathologic abnormalities.

.

D. Colon biopsy, sigmoid colon polyp:

- Tubular adenoma.

(ISRRAELM:pit; 02/14/2022)

[facility]  02/14/2022  1439 Local

**********************************************************************

.                                                                02

Comment:

Sections of the duodenal biopsy reveal multiple segments of duodenal and

small intestine mucosa.  Where best oriented, the mucosal villi show no

sprue-like changes or significant inflammatory changes.

.

Sections of the esophageal biopsy reveal multiple segments of mildly

hyperplastic squamous esophageal mucosa, and a single segment of gastric

mucosa showing mild to moderate chronic inflammation.  The findings are

consistent with reflux changes.  There is no evidence of Mcgill's change,

dysplasia, or malignancy.

.

Sections of the random colon biopsy reveal multiple segments of colonic

mucosa containing several mucosal-associated lymphoid aggregates.  There

is no evidence of a chronic destructive colitis, lymphocytic colitis, or

collagenous colitis.

.

Sections of the sigmoid colon biopsy reveal a tubular adenoma showing no

high-grade dysplasia or evidence of malignancy.

(JPM:pit; 02/14/2022)

.                                                                02

Electronically signed:                                     .

Rohit Mandujano MD, Pathologist

NPI- [id number]

.                                                                01

Gross description:                                         .

A.  The specimen is received in formalin, labeled "Jerica Bello,

duodenal BX".  Received are multiple segments of white-tan tissue ranging

in size from 0.2-0.5 cm in maximum dimensions.  The specimen is entirely

submitted in cassette A1.

.

B.  The specimen is received in formalin, labeled "Ace, Jerica,

esophageal BX".  Received are 5 segments of white-tan tissue ranging in

size from 0.3-0.4 cm in maximum dimensions.  The specimen is entirely

submitted in cassette B1.

.

C.  The specimen is received in formalin, labeled "Diegoo, Jerica,

random colon BX".  Received are multiple segments of light brown tissue

ranging in size from 0.3-0.5 cm in maximum dimensions.  The specimen is

entirely submitted in cassette C1.

.

D.  The specimen is received in formalin, labeled "Ace, Jerica,

sigmoid colon polyp".  Received is a single segment of dark brown,

polypoid tissue measuring 0.5 cm in maximum dimensions.  The specimen is

entirely submitted in cassette D1.

([facility]; 2/11/2022)

[facility]  02/11/2022  1613 Local

.                                                                02

Pathologist provided ICD-10:

K21.9, D12.5

.                                                                02

CPT                                                        .

611859, [id number], [id number], [id number]

Specimen Comment: A courtesy copy of this report has been sent to [phone], [phone]

Specimen Comment: [id number]

Specimen Comment: Report sent to  / DR MARCELO

Specimen Comment: A duplicate report has been generated due to demographic updates.

***Performed at:  01

   [facility]

   [address], [place]  [id number]

   MD Jesse Bell MD Phone:  [phone]

***Performed at:  02

   [facility]

   [address], [place]  [id number]

   MD Rohit Mandujano MD Phone:  [phone]